# Patient Record
Sex: FEMALE | Race: OTHER | URBAN - METROPOLITAN AREA
[De-identification: names, ages, dates, MRNs, and addresses within clinical notes are randomized per-mention and may not be internally consistent; named-entity substitution may affect disease eponyms.]

---

## 2017-08-11 PROBLEM — Z00.00 ENCOUNTER FOR PREVENTIVE HEALTH EXAMINATION: Status: ACTIVE | Noted: 2017-08-11

## 2024-01-15 ENCOUNTER — EMERGENCY (EMERGENCY)
Facility: HOSPITAL | Age: 84
LOS: 0 days | Discharge: HOME | End: 2024-01-15
Attending: EMERGENCY MEDICINE
Payer: MEDICARE

## 2024-01-15 VITALS
RESPIRATION RATE: 82 BRPM | DIASTOLIC BLOOD PRESSURE: 82 MMHG | OXYGEN SATURATION: 98 % | HEIGHT: 62 IN | HEART RATE: 84 BPM | WEIGHT: 125 LBS | SYSTOLIC BLOOD PRESSURE: 198 MMHG | TEMPERATURE: 98 F

## 2024-01-15 VITALS
DIASTOLIC BLOOD PRESSURE: 69 MMHG | SYSTOLIC BLOOD PRESSURE: 153 MMHG | HEART RATE: 73 BPM | TEMPERATURE: 98 F | RESPIRATION RATE: 18 BRPM | OXYGEN SATURATION: 97 %

## 2024-01-15 DIAGNOSIS — I10 ESSENTIAL (PRIMARY) HYPERTENSION: ICD-10-CM

## 2024-01-15 DIAGNOSIS — F03.90 UNSPECIFIED DEMENTIA WITHOUT BEHAVIORAL DISTURBANCE: ICD-10-CM

## 2024-01-15 DIAGNOSIS — R41.0 DISORIENTATION, UNSPECIFIED: ICD-10-CM

## 2024-01-15 LAB
ALBUMIN SERPL ELPH-MCNC: 3.4 G/DL — SIGNIFICANT CHANGE UP (ref 3.3–5)
ALBUMIN SERPL ELPH-MCNC: 3.4 G/DL — SIGNIFICANT CHANGE UP (ref 3.3–5)
ALP SERPL-CCNC: 112 U/L — SIGNIFICANT CHANGE UP (ref 40–120)
ALP SERPL-CCNC: 112 U/L — SIGNIFICANT CHANGE UP (ref 40–120)
ALT FLD-CCNC: 20 U/L — SIGNIFICANT CHANGE UP (ref 12–78)
ALT FLD-CCNC: 20 U/L — SIGNIFICANT CHANGE UP (ref 12–78)
ANION GAP SERPL CALC-SCNC: 5 MMOL/L — SIGNIFICANT CHANGE UP (ref 5–17)
ANION GAP SERPL CALC-SCNC: 5 MMOL/L — SIGNIFICANT CHANGE UP (ref 5–17)
APPEARANCE UR: CLEAR — SIGNIFICANT CHANGE UP
APPEARANCE UR: CLEAR — SIGNIFICANT CHANGE UP
AST SERPL-CCNC: 17 U/L — SIGNIFICANT CHANGE UP (ref 15–37)
AST SERPL-CCNC: 17 U/L — SIGNIFICANT CHANGE UP (ref 15–37)
BACTERIA # UR AUTO: ABNORMAL /HPF
BACTERIA # UR AUTO: ABNORMAL /HPF
BASOPHILS # BLD AUTO: 0.04 K/UL — SIGNIFICANT CHANGE UP (ref 0–0.2)
BASOPHILS # BLD AUTO: 0.04 K/UL — SIGNIFICANT CHANGE UP (ref 0–0.2)
BASOPHILS NFR BLD AUTO: 0.6 % — SIGNIFICANT CHANGE UP (ref 0–2)
BASOPHILS NFR BLD AUTO: 0.6 % — SIGNIFICANT CHANGE UP (ref 0–2)
BILIRUB SERPL-MCNC: 0.5 MG/DL — SIGNIFICANT CHANGE UP (ref 0.2–1.2)
BILIRUB SERPL-MCNC: 0.5 MG/DL — SIGNIFICANT CHANGE UP (ref 0.2–1.2)
BILIRUB UR-MCNC: NEGATIVE — SIGNIFICANT CHANGE UP
BILIRUB UR-MCNC: NEGATIVE — SIGNIFICANT CHANGE UP
BUN SERPL-MCNC: 24 MG/DL — HIGH (ref 7–23)
BUN SERPL-MCNC: 24 MG/DL — HIGH (ref 7–23)
CALCIUM SERPL-MCNC: 8.7 MG/DL — SIGNIFICANT CHANGE UP (ref 8.5–10.1)
CALCIUM SERPL-MCNC: 8.7 MG/DL — SIGNIFICANT CHANGE UP (ref 8.5–10.1)
CHLORIDE SERPL-SCNC: 106 MMOL/L — SIGNIFICANT CHANGE UP (ref 96–108)
CHLORIDE SERPL-SCNC: 106 MMOL/L — SIGNIFICANT CHANGE UP (ref 96–108)
CO2 SERPL-SCNC: 28 MMOL/L — SIGNIFICANT CHANGE UP (ref 22–31)
CO2 SERPL-SCNC: 28 MMOL/L — SIGNIFICANT CHANGE UP (ref 22–31)
COLOR SPEC: YELLOW — SIGNIFICANT CHANGE UP
COLOR SPEC: YELLOW — SIGNIFICANT CHANGE UP
CREAT SERPL-MCNC: 0.93 MG/DL — SIGNIFICANT CHANGE UP (ref 0.5–1.3)
CREAT SERPL-MCNC: 0.93 MG/DL — SIGNIFICANT CHANGE UP (ref 0.5–1.3)
DIFF PNL FLD: ABNORMAL
DIFF PNL FLD: ABNORMAL
EGFR: 60 ML/MIN/1.73M2 — SIGNIFICANT CHANGE UP
EGFR: 60 ML/MIN/1.73M2 — SIGNIFICANT CHANGE UP
EOSINOPHIL # BLD AUTO: 0.02 K/UL — SIGNIFICANT CHANGE UP (ref 0–0.5)
EOSINOPHIL # BLD AUTO: 0.02 K/UL — SIGNIFICANT CHANGE UP (ref 0–0.5)
EOSINOPHIL NFR BLD AUTO: 0.3 % — SIGNIFICANT CHANGE UP (ref 0–6)
EOSINOPHIL NFR BLD AUTO: 0.3 % — SIGNIFICANT CHANGE UP (ref 0–6)
EPI CELLS # UR: PRESENT
EPI CELLS # UR: PRESENT
GLUCOSE SERPL-MCNC: 278 MG/DL — HIGH (ref 70–99)
GLUCOSE SERPL-MCNC: 278 MG/DL — HIGH (ref 70–99)
GLUCOSE UR QL: 500 MG/DL
GLUCOSE UR QL: 500 MG/DL
HCT VFR BLD CALC: 36.9 % — SIGNIFICANT CHANGE UP (ref 34.5–45)
HCT VFR BLD CALC: 36.9 % — SIGNIFICANT CHANGE UP (ref 34.5–45)
HGB BLD-MCNC: 12.3 G/DL — SIGNIFICANT CHANGE UP (ref 11.5–15.5)
HGB BLD-MCNC: 12.3 G/DL — SIGNIFICANT CHANGE UP (ref 11.5–15.5)
IMM GRANULOCYTES NFR BLD AUTO: 0.4 % — SIGNIFICANT CHANGE UP (ref 0–0.9)
IMM GRANULOCYTES NFR BLD AUTO: 0.4 % — SIGNIFICANT CHANGE UP (ref 0–0.9)
KETONES UR-MCNC: NEGATIVE MG/DL — SIGNIFICANT CHANGE UP
KETONES UR-MCNC: NEGATIVE MG/DL — SIGNIFICANT CHANGE UP
LEUKOCYTE ESTERASE UR-ACNC: NEGATIVE — SIGNIFICANT CHANGE UP
LEUKOCYTE ESTERASE UR-ACNC: NEGATIVE — SIGNIFICANT CHANGE UP
LYMPHOCYTES # BLD AUTO: 0.72 K/UL — LOW (ref 1–3.3)
LYMPHOCYTES # BLD AUTO: 0.72 K/UL — LOW (ref 1–3.3)
LYMPHOCYTES # BLD AUTO: 10.8 % — LOW (ref 13–44)
LYMPHOCYTES # BLD AUTO: 10.8 % — LOW (ref 13–44)
MCHC RBC-ENTMCNC: 29.4 PG — SIGNIFICANT CHANGE UP (ref 27–34)
MCHC RBC-ENTMCNC: 29.4 PG — SIGNIFICANT CHANGE UP (ref 27–34)
MCHC RBC-ENTMCNC: 33.3 G/DL — SIGNIFICANT CHANGE UP (ref 32–36)
MCHC RBC-ENTMCNC: 33.3 G/DL — SIGNIFICANT CHANGE UP (ref 32–36)
MCV RBC AUTO: 88.1 FL — SIGNIFICANT CHANGE UP (ref 80–100)
MCV RBC AUTO: 88.1 FL — SIGNIFICANT CHANGE UP (ref 80–100)
MONOCYTES # BLD AUTO: 0.57 K/UL — SIGNIFICANT CHANGE UP (ref 0–0.9)
MONOCYTES # BLD AUTO: 0.57 K/UL — SIGNIFICANT CHANGE UP (ref 0–0.9)
MONOCYTES NFR BLD AUTO: 8.5 % — SIGNIFICANT CHANGE UP (ref 2–14)
MONOCYTES NFR BLD AUTO: 8.5 % — SIGNIFICANT CHANGE UP (ref 2–14)
NEUTROPHILS # BLD AUTO: 5.31 K/UL — SIGNIFICANT CHANGE UP (ref 1.8–7.4)
NEUTROPHILS # BLD AUTO: 5.31 K/UL — SIGNIFICANT CHANGE UP (ref 1.8–7.4)
NEUTROPHILS NFR BLD AUTO: 79.4 % — HIGH (ref 43–77)
NEUTROPHILS NFR BLD AUTO: 79.4 % — HIGH (ref 43–77)
NITRITE UR-MCNC: NEGATIVE — SIGNIFICANT CHANGE UP
NITRITE UR-MCNC: NEGATIVE — SIGNIFICANT CHANGE UP
NRBC # BLD: 0 /100 WBCS — SIGNIFICANT CHANGE UP (ref 0–0)
NRBC # BLD: 0 /100 WBCS — SIGNIFICANT CHANGE UP (ref 0–0)
PH UR: 5.5 — SIGNIFICANT CHANGE UP (ref 5–8)
PH UR: 5.5 — SIGNIFICANT CHANGE UP (ref 5–8)
PLATELET # BLD AUTO: 255 K/UL — SIGNIFICANT CHANGE UP (ref 150–400)
PLATELET # BLD AUTO: 255 K/UL — SIGNIFICANT CHANGE UP (ref 150–400)
POTASSIUM SERPL-MCNC: 4.1 MMOL/L — SIGNIFICANT CHANGE UP (ref 3.5–5.3)
POTASSIUM SERPL-MCNC: 4.1 MMOL/L — SIGNIFICANT CHANGE UP (ref 3.5–5.3)
POTASSIUM SERPL-SCNC: 4.1 MMOL/L — SIGNIFICANT CHANGE UP (ref 3.5–5.3)
POTASSIUM SERPL-SCNC: 4.1 MMOL/L — SIGNIFICANT CHANGE UP (ref 3.5–5.3)
PROT SERPL-MCNC: 7.2 GM/DL — SIGNIFICANT CHANGE UP (ref 6–8.3)
PROT SERPL-MCNC: 7.2 GM/DL — SIGNIFICANT CHANGE UP (ref 6–8.3)
PROT UR-MCNC: 100 MG/DL
PROT UR-MCNC: 100 MG/DL
RBC # BLD: 4.19 M/UL — SIGNIFICANT CHANGE UP (ref 3.8–5.2)
RBC # BLD: 4.19 M/UL — SIGNIFICANT CHANGE UP (ref 3.8–5.2)
RBC # FLD: 14.2 % — SIGNIFICANT CHANGE UP (ref 10.3–14.5)
RBC # FLD: 14.2 % — SIGNIFICANT CHANGE UP (ref 10.3–14.5)
RBC CASTS # UR COMP ASSIST: SIGNIFICANT CHANGE UP /HPF (ref 0–4)
RBC CASTS # UR COMP ASSIST: SIGNIFICANT CHANGE UP /HPF (ref 0–4)
SODIUM SERPL-SCNC: 139 MMOL/L — SIGNIFICANT CHANGE UP (ref 135–145)
SODIUM SERPL-SCNC: 139 MMOL/L — SIGNIFICANT CHANGE UP (ref 135–145)
SP GR SPEC: 1.02 — SIGNIFICANT CHANGE UP (ref 1–1.03)
SP GR SPEC: 1.02 — SIGNIFICANT CHANGE UP (ref 1–1.03)
UROBILINOGEN FLD QL: 0.2 MG/DL — SIGNIFICANT CHANGE UP (ref 0.2–1)
UROBILINOGEN FLD QL: 0.2 MG/DL — SIGNIFICANT CHANGE UP (ref 0.2–1)
WBC # BLD: 6.69 K/UL — SIGNIFICANT CHANGE UP (ref 3.8–10.5)
WBC # BLD: 6.69 K/UL — SIGNIFICANT CHANGE UP (ref 3.8–10.5)
WBC # FLD AUTO: 6.69 K/UL — SIGNIFICANT CHANGE UP (ref 3.8–10.5)
WBC # FLD AUTO: 6.69 K/UL — SIGNIFICANT CHANGE UP (ref 3.8–10.5)
WBC UR QL: SIGNIFICANT CHANGE UP /HPF (ref 0–5)
WBC UR QL: SIGNIFICANT CHANGE UP /HPF (ref 0–5)

## 2024-01-15 PROCEDURE — 99284 EMERGENCY DEPT VISIT MOD MDM: CPT

## 2024-01-15 PROCEDURE — 70450 CT HEAD/BRAIN W/O DYE: CPT | Mod: 26,MA

## 2024-01-15 RX ORDER — AMLODIPINE BESYLATE 2.5 MG/1
5 TABLET ORAL ONCE
Refills: 0 | Status: COMPLETED | OUTPATIENT
Start: 2024-01-15 | End: 2024-01-15

## 2024-01-15 RX ADMIN — AMLODIPINE BESYLATE 5 MILLIGRAM(S): 2.5 TABLET ORAL at 14:07

## 2024-01-15 NOTE — ED PROVIDER NOTE - MUSCULOSKELETAL, MLM
Spine appears normal, range of motion is not limited, no muscle or joint tenderness no edema no tender to palp bilateral legs

## 2024-01-15 NOTE — ED ADULT TRIAGE NOTE - PATIENT ON (OXYGEN DELIVERY METHOD)
02/13/23    Patient notified form is ready for  by leaving a voice message  Form faxed 4 times to [phone], confirmation received that it did not go through  [address] [place] [address],[address] [place] [address] for another fax #, but they did not have another fax #      Form scanned into Pt Chart &  Placed at  [address], under the letter C          Form scanned into patient chart  Form placed in  bin room air

## 2024-01-15 NOTE — ED PROVIDER NOTE - NS ED MD DISPO DISCHARGE
Please call patient.     Her IUD is in the correct position based on the pelvic US without any other abnormalities.  How did she tolerate the ultrasound?     If she has pain with the ultrasound she could have an infection and we need to treat her for this. If so, I would like to see her in clinic and I could add her on tomorrow at 8:45 AM or 4:45 PM.     Results:     Transabdominal and transvaginal scanning of the pelvis demonstrates an  anteverted uterus measuring 5.0 cm long axis. Endometrial thickness is  approximately 3 mm. There is an IUD in good position the endometrial  cavity. The right ovary measures 3.3 x 1.7 x 1.8 cm. Left ovary measures  3.0 x 1.6 x 1.8 cm. No free fluid is seen.     IMPRESSION: IUD in good position in the endometrial cavity       Gris Sharpe MD     Home

## 2024-01-15 NOTE — ED PROVIDER NOTE - CLINICAL SUMMARY MEDICAL DECISION MAKING FREE TEXT BOX
83 years old female brought in by her daughter Ermelinda Morales [phone] sts pt has been having increased confusion for the past week and pt was wondering out side of house and return to go back noted by the neighbor. She sts pt liver alone no aide at home. Pt is alert and oriented x 2 follows verbal commends and sts her daughter is trying to take her money and she has a friend who is helping her at home. labs ct of head and  is notified. 83 years old female brought in by her daughter Ermelinda Morales [phone] sts pt has been having increased confusion for the past week and pt was wondering out side of house and return to go back noted by the neighbor. She sts pt liver alone no aide at home. Pt is alert and oriented x 2 follows verbal commends and sts her daughter is trying to take her money and she has a friend who is helping her at home. labs ct of head and  is notified. 83 years old female brought in by her daughter Ermelinda Morales [phone] sts pt has been having increased confusion for the past week and pt was wondering out side of house and return to go back noted by the neighbor. She sts pt liver alone no aide at home. Pt is alert and oriented x 2 follows verbal commends and sts her daughter is trying to take her money and she has a friend who is helping her at home. labs ct of head and  is notified. 83 years old female brought in by her daughter Ermelinda Morales [phone] sts pt has been having increased confusion for the past week and pt was wondering out side of house and return to go back noted by the neighbor. She sts pt liver alone no aide at home. Pt is alert and oriented x 2 follows verbal commends and sts her daughter is trying to take her money and she has a friend who is helping her at home. labs ct of head and  is notified.  wbc, h/h normal, glucose creatine lft and urine are normal. Ermelinda [facility] she is currently trying to get an aide currently.   progress note. 83 years old female brought in by her daughter Ermelinda Morales [phone] sts pt has been having increased confusion for the past week and pt was wondering out side of house and return to go back noted by the neighbor. She sts pt liver alone no aide at home. Pt is alert and oriented x 2 follows verbal commends and sts her daughter is trying to take her money and she has a friend who is helping her at home. labs ct of head and  is notified.  wbc, h/h normal, glucose creatine lft and urine are normal. Ermelinda [facility] she is currently trying to get an aide currently.   progress note. 83 years old female brought in by her daughter Ermelinda Morales [phone] sts pt has been having increased confusion for the past week and pt was wondering out side of house and return to go back noted by the neighbor. She sts pt liver alone no aide at home. Pt is alert and oriented x 2 follows verbal commends and sts her daughter is trying to take her money and she has a friend who is helping her at home. labs ct of head and  is notified.  wbc, h/h normal, glucose creatine lft and urine are normal. Ermelinda [facility] she is currently trying to get an aide currently.   progress note.

## 2024-01-15 NOTE — ED ADULT TRIAGE NOTE - CHIEF COMPLAINT QUOTE
As per ems patient daughter states patient has dementia and has been combative, wandering outside without coat for the past week. As per ems patient daughter states patient has dementia and has been combative, wandering outside without coat for the past week. h/o htn, dementia.

## 2024-01-15 NOTE — ED ADULT NURSE NOTE - NSFALLHARMRISKINTERV_ED_ALL_ED
Assistance OOB with selected safe patient handling equipment if applicable/Assistance with ambulation/Communicate risk of Fall with Harm to all staff, patient, and family/Monitor gait and stability/Monitor for mental status changes and reorient to person, place, and time, as needed/Provide visual cue: red socks, yellow wristband, yellow gown, etc/Reinforce activity limits and safety measures with patient and family/Toileting schedule using arm’s reach rule for commode and bathroom/Use of alarms - bed, stretcher, chair and/or video monitoring/Bed in lowest position, wheels locked, appropriate side rails in place/Call bell, personal items and telephone in reach/Instruct patient to call for assistance before getting out of bed/chair/stretcher/Non-slip footwear applied when patient is off stretcher/[place] to call system/Physically safe environment - no spills, clutter or unnecessary equipment/Purposeful Proactive Rounding/Room/bathroom lighting operational, light cord in reach Assistance OOB with selected safe patient handling equipment if applicable/Assistance with ambulation/Communicate risk of Fall with Harm to all staff, patient, and family/Monitor gait and stability/Monitor for mental status changes and reorient to person, place, and time, as needed/Provide visual cue: red socks, yellow wristband, yellow gown, etc/Reinforce activity limits and safety measures with patient and family/Toileting schedule using arm’s reach rule for commode and bathroom/Use of alarms - bed, stretcher, chair and/or video monitoring/Bed in lowest position, wheels locked, appropriate side rails in place/Call bell, personal items and telephone in reach/Instruct patient to call for assistance before getting out of bed/chair/stretcher/Non-slip footwear applied when patient is off stretcher/[place] to call system/Physically safe environment - no spills, clutter or unnecessary equipment/Purposeful Proactive Rounding/Room/bathroom lighting operational, light cord in reach Assistance OOB with selected safe patient handling equipment if applicable/Assistance with ambulation/Communicate risk of Fall with Harm to all staff, patient, and family/Monitor gait and stability/Monitor for mental status changes and reorient to person, place, and time, as needed/Provide visual cue: red socks, yellow wristband, yellow gown, etc/Reinforce activity limits and safety measures with patient and family/Toileting schedule using arm’s reach rule for commode and bathroom/Use of alarms - bed, stretcher, chair and/or video monitoring/Bed in lowest position, wheels locked, appropriate side rails in place/Call bell, personal items and telephone in reach/Instruct patient to call for assistance before getting out of bed/chair/stretcher/Non-slip footwear applied when patient is off stretcher/[place] to call system/Physically safe environment - no spills, clutter or unnecessary equipment/Purposeful Proactive Rounding/Room/bathroom lighting operational, light cord in reach

## 2024-01-15 NOTE — ED PROVIDER NOTE - PROGRESS NOTE DETAILS
is notified.  was here talked to pt's daughter who sts she will come back to take pt home if pt is discharged. daughter's friend is here sts pt is at her base line mental status now pt did have intermittent of episode being nervous she will take pt home and also sts the daughter is trying to get an aide. Pt has been cooperative and calm daughter Ermelinda is notified and explained all test result and sts she is coming to take pt home.

## 2024-01-15 NOTE — ED PROVIDER NOTE - PATIENT PORTAL LINK FT
You can access the FollowMyHealth Patient Portal offered by [facility] by registering at the following website: [email or web address]. By joining [employer]’s FollowMyHealth portal, you will also be able to view your health information using other applications (apps) compatible with our system. You can access the FollowMyHealth Patient Portal offered by [facility] by registering at the following website: [email or web address]. By joining [employer]’s FollowMyHealth portal, you will also be able to view your health information using other applications (apps) compatible with our system. You can access the FollowMyHealth Patient Portal offered by [facility] by registering at the following website: [email or web address]. By joining [employer]’s FollowMyHealth portal, you will also be able to view your health information using other applications (apps) compatible with our system.

## 2024-01-15 NOTE — ED ADULT NURSE NOTE - CHIEF COMPLAINT QUOTE
As per ems patient daughter states patient has dementia and has been combative, wandering outside without coat for the past week. h/o htn, dementia.

## 2024-01-15 NOTE — ED ADULT NURSE NOTE - CINV DISCH TEACH PARTICIP
PT was discharged home with her daughter, Discharge instructions were done with her daughter./Patient

## 2024-01-15 NOTE — ED PROVIDER NOTE - NEUROLOGICAL LEVEL OF CONSCIOUSNESS
[facility] - Lexie / Salima WORTHY /Oliver/ ZAYNAB Coughlin/ ZAYNAB Hidalgo/ Johnson Livingston / JACOB Njeru  ---------------------------------------------------------------------------------------------------------------    Patient seen and examined bedside    Subjective and Objective: No overnight events, more alert  no V/D per RN    Allergies: No Known Allergies      Hospital Medications:   MEDICATIONS  (STANDING):  amLODIPine   Tablet 10 milliGRAM(s) Oral daily  aspirin enteric coated 81 milliGRAM(s) Oral daily  cefTRIAXone   IVPB 1000 milliGRAM(s) IV Intermittent every 24 hours  dextrose 5%. 1000 milliLiter(s) (50 mL/Hr) IV Continuous <Continuous>  dextrose 50% Injectable 12.5 Gram(s) IV Push once  dextrose 50% Injectable 25 Gram(s) IV Push once  dextrose 50% Injectable 25 Gram(s) IV Push once  heparin  Injectable 5000 Unit(s) SubCutaneous every 8 hours  insulin lispro (HumaLOG) corrective regimen sliding scale   SubCutaneous three times a day before meals  insulin lispro (HumaLOG) corrective regimen sliding scale   SubCutaneous at bedtime  memantine 10 milliGRAM(s) Oral daily  metoprolol tartrate 12.5 milliGRAM(s) Oral two times a day  multivitamin 1 Tablet(s) Oral daily  sodium bicarbonate 650 milliGRAM(s) Oral two times a day  sodium chloride 0.9%. 1000 milliLiter(s) (60 mL/Hr) IV Continuous <Continuous>      VITALS:  T(F): 97.1 (20 @ 04:50), Max: 98.3 (20 @ 12:50)  HR: 74 (20 @ 04:50)  BP: 139/65 (20 @ 04:50)  RR: 16 (20 @ 04:50)  SpO2: 99% (20 @ 04:50)  Wt(kg): --     @ 07:  -   @ 07:00  --------------------------------------------------------  IN: 968 mL / OUT: 1350 mL / NET: -382 mL        PHYSICAL EXAM:  Constitutional: NAD  HEENT: anicteric sclera, oropharynx clear  Neck: No JVD  Respiratory: CTAB, no wheezes, rales or rhonchi  Cardiovascular: S1, S2, RRR  Gastrointestinal: BS+, soft, NT/ND  Extremities: No cyanosis or clubbing. No peripheral edema  Neurological: A/O x 1  : No murcia.       LABS:      138  |  110<H>  |  24<H>  ----------------------------<  134<H>  3.8   |  17<L>  |  1.48<H>    Ca    9.1      2020 05:45      Creatinine Trend: 1.48 <--, 1.47 <--, 1.50 <--, 1.49 <--, 2.09 <--, 2.27 <--, 2.05 <--                        10.1   7.88  )-----------( 199      ( 2020 05:45 )             31.9     Urine Studies:  Urinalysis Basic - ( 2020 03:20 )    Color: YELLOW / Appearance: CLEAR / S.019 / pH: 6.0  Gluc: NEGATIVE / Ketone: NEGATIVE  / Bili: NEGATIVE / Urobili: NORMAL   Blood: NEGATIVE / Protein: 30 / Nitrite: NEGATIVE   Leuk Esterase: SMALL / RBC: 0-2 / WBC 6-10   Sq Epi: FEW / Non Sq Epi:  / Bacteria: NEGATIVE      Sodium, Random Urine: 144 mmol/L ( @ 18:07)  Chloride, Random Urine: 124 mmol/L ( @ 18:07)  Osmolality, Random Urine: 494 mosmo/kg ( @ 18:07)      RADIOLOGY & ADDITIONAL STUDIES: [facility] - Lexie / Salima WORTHY /Oliver/ ZAYNAB Coughlin/ ZAYNAB Hidalgo/ Johnson Livingston / JACOB Njeru  ---------------------------------------------------------------------------------------------------------------    Patient seen and examined bedside    Subjective and Objective: No overnight events, more alert  no V/D per RN    Allergies: No Known Allergies      Hospital Medications:   MEDICATIONS  (STANDING):  amLODIPine   Tablet 10 milliGRAM(s) Oral daily  aspirin enteric coated 81 milliGRAM(s) Oral daily  cefTRIAXone   IVPB 1000 milliGRAM(s) IV Intermittent every 24 hours  dextrose 5%. 1000 milliLiter(s) (50 mL/Hr) IV Continuous <Continuous>  dextrose 50% Injectable 12.5 Gram(s) IV Push once  dextrose 50% Injectable 25 Gram(s) IV Push once  dextrose 50% Injectable 25 Gram(s) IV Push once  heparin  Injectable 5000 Unit(s) SubCutaneous every 8 hours  insulin lispro (HumaLOG) corrective regimen sliding scale   SubCutaneous three times a day before meals  insulin lispro (HumaLOG) corrective regimen sliding scale   SubCutaneous at bedtime  memantine 10 milliGRAM(s) Oral daily  metoprolol tartrate 12.5 milliGRAM(s) Oral two times a day  multivitamin 1 Tablet(s) Oral daily  sodium bicarbonate 650 milliGRAM(s) Oral two times a day  sodium chloride 0.9%. 1000 milliLiter(s) (60 mL/Hr) IV Continuous <Continuous>      VITALS:  T(F): 97.1 (20 @ 04:50), Max: 98.3 (20 @ 12:50)  HR: 74 (20 @ 04:50)  BP: 139/65 (20 @ 04:50)  RR: 16 (20 @ 04:50)  SpO2: 99% (20 @ 04:50)  Wt(kg): --     @ 07:  -   @ 07:00  --------------------------------------------------------  IN: 968 mL / OUT: 1350 mL / NET: -382 mL      PHYSICAL EXAM:  Constitutional: NAD  HEENT: anicteric sclera, oropharynx clear  Neck: No JVD  Respiratory: CTAB, no wheezes, rales or rhonchi  Cardiovascular: S1, S2, RRR  Gastrointestinal: BS+, soft, NT  Extremities: No cyanosis or clubbing. No peripheral edema  Neurological: A/O x 1  : +murcia.       LABS:      138  |  110<H>  |  24<H>  ----------------------------<  134<H>  3.8   |  17<L>  |  1.48<H>    Ca    9.1      2020 05:45      Creatinine Trend: 1.48 <--, 1.47 <--, 1.50 <--, 1.49 <--, 2.09 <--, 2.27 <--, 2.05 <--                        10.1   7.88  )-----------( 199      ( 2020 05:45 )             31.9     Urine Studies:  Urinalysis Basic - ( 2020 03:20 )    Color: YELLOW / Appearance: CLEAR / S.019 / pH: 6.0  Gluc: NEGATIVE / Ketone: NEGATIVE  / Bili: NEGATIVE / Urobili: NORMAL   Blood: NEGATIVE / Protein: 30 / Nitrite: NEGATIVE   Leuk Esterase: SMALL / RBC: 0-2 / WBC 6-10   Sq Epi: FEW / Non Sq Epi:  / Bacteria: NEGATIVE      Sodium, Random Urine: 144 mmol/L ( @ 18:07)  Chloride, Random Urine: 124 mmol/L ( @ 18:07)  Osmolality, Random Urine: 494 mosmo/kg ( @ 18:07)      RADIOLOGY & ADDITIONAL STUDIES: alert/follows commands/CONFUSED

## 2024-01-15 NOTE — ED PROVIDER NOTE - OBJECTIVE STATEMENT
83 years old female brought in by daughter Ermelinda [phone] sts pt has been having increased confusion for the past week. Today pt was wondering out side of house and refused to go home. Daughter sts pt has a hx of dementia and hypertension and pt lives alone Daughter sts she is not sure pt is taking her med for hypertension and daughter does not know the name of medication. no medication on the [facility] record. Pt is alert and following verbal commends but unable to give detail hx. pt is alert and oriented to person and time only but pt sts she has a hx of cataract uses eye drop but she dose have hypertension. Pt sts her daughter is trying to take away her money. 83 years old female brought in by daughter Ermelinda [phone] sts pt has been having increased confusion for the past week. Today pt was wondering out side of house and refused to go home. Daughter sts pt has a hx of dementia and hypertension and pt lives alone Daughter sts she is not sure pt is taking her med for hypertension and daughter does not know the name of medication. no medication on the [facility] rise record. Pt is alert and following verbal commends but unable to give detail hx. pt is alert and oriented to person and time only but pt sts she has a hx of cataract uses eye drop but she dose have hypertension. Pt sts her daughter is trying to take away her money. 83 years old female brought in by daughter Ermelinda [phone] sts pt has been having increased confusion for the past week. Today pt was wondering out side of house and refused to go home. Daughter sts pt has a hx of dementia and hypertension and pt lives alone Daughter sts she is not sure pt is taking her med for hypertension and daughter does not know the name of medication. no medication on the sun rise record. Pt is alert and following verbal commends but unable to give detail hx. pt is alert and oriented to person and time only but pt sts she has a hx of cataract uses eye drop but she dose have hypertension. Pt sts her daughter is trying to take away her money. 83 years old female brought in by daughter Ermelinda [phone] sts pt has been having intermittent increased confusion for the past week. Today pt was wondering out side of house and refused to go home. Daughter sts pt has a hx of dementia and hypertension and pt lives alone Daughter sts she is not sure pt is taking her med for hypertension and daughter does not know the name of medication. no medication on the sun rise record. Pt is alert and following verbal commends but unable to give detail hx. pt is alert and oriented to person and time only but pt sts she has a hx of cataract uses eye drop but she dose have hypertension. Pt sts her daughter is trying to take away her money. 83 years old female brought in by daughter Ermelinda [phone] sts pt has been having intermittent increased confusion for the past week. Today pt was wondering out side of house and refused to go home. Daughter sts pt has a hx of dementia and hypertension and pt lives alone Daughter sts she is not sure pt is taking her med for hypertension and daughter does not know the name of medication. no medication on the sun rise record. Pt is alert and following verbal commends but unable to give detail hx. pt is alert and oriented to person and time only but pt sts she has a hx of cataract uses eye drop but she dose have hypertension. Pt sts her daughter is trying to take away her money. 83 years old female brought in by daughter Ermelinda [phone] sts pt has been having intermittent increased confusion for the past week. Today pt was wondering out side of house and refused to go home. Daughter sts pt has a hx of dementia and hypertension and pt lives alone Daughter sts she is not sure pt is taking her med for hypertension and daughter does not know the name of medication. no medication on the [facility] record. Pt is alert and following verbal commends but unable to give detail hx. pt is alert and oriented to person and time only but pt sts she has a hx of cataract uses eye drop but she dose have hypertension. Pt sts her daughter is trying to take away her money.

## 2024-01-15 NOTE — ED PROVIDER NOTE - CONSTITUTIONAL, MLM
normal... Well appearing, awake, alert, oriented to person, place, and in no apparent distress. Speaking in clear full sentences taking and tolerating lunch in the stretcher appears very comfortable

## 2024-08-21 ENCOUNTER — INPATIENT (INPATIENT)
Facility: HOSPITAL | Age: 84
LOS: 7 days | Discharge: NURSING, REHAB OR LONG-TERM CARE | End: 2024-08-29
Attending: STUDENT IN AN ORGANIZED HEALTH CARE EDUCATION/TRAINING PROGRAM | Admitting: STUDENT IN AN ORGANIZED HEALTH CARE EDUCATION/TRAINING PROGRAM
Payer: MEDICARE

## 2024-08-21 VITALS
SYSTOLIC BLOOD PRESSURE: 132 MMHG | RESPIRATION RATE: 18 BRPM | HEART RATE: 72 BPM | WEIGHT: 113.98 LBS | OXYGEN SATURATION: 97 % | HEIGHT: 62 IN | DIASTOLIC BLOOD PRESSURE: 80 MMHG | TEMPERATURE: 98 F

## 2024-08-21 PROBLEM — H26.9 UNSPECIFIED CATARACT: Chronic | Status: ACTIVE | Noted: 2024-01-15

## 2024-08-21 LAB
ALBUMIN SERPL ELPH-MCNC: 3.3 G/DL — SIGNIFICANT CHANGE UP (ref 3.3–5)
ALP SERPL-CCNC: 55 U/L — SIGNIFICANT CHANGE UP (ref 40–120)
ALT FLD-CCNC: 27 U/L — SIGNIFICANT CHANGE UP (ref 12–78)
ANION GAP SERPL CALC-SCNC: 13 MMOL/L — SIGNIFICANT CHANGE UP (ref 5–17)
APPEARANCE UR: ABNORMAL
AST SERPL-CCNC: 29 U/L — SIGNIFICANT CHANGE UP (ref 15–37)
BACTERIA # UR AUTO: NEGATIVE /HPF — SIGNIFICANT CHANGE UP
BASOPHILS # BLD AUTO: 0.02 K/UL — SIGNIFICANT CHANGE UP (ref 0–0.2)
BASOPHILS NFR BLD AUTO: 0.2 % — SIGNIFICANT CHANGE UP (ref 0–2)
BILIRUB SERPL-MCNC: 1.4 MG/DL — HIGH (ref 0.2–1.2)
BILIRUB UR-MCNC: NEGATIVE — SIGNIFICANT CHANGE UP
BUN SERPL-MCNC: 23 MG/DL — SIGNIFICANT CHANGE UP (ref 7–23)
CALCIUM SERPL-MCNC: 9.3 MG/DL — SIGNIFICANT CHANGE UP (ref 8.5–10.1)
CHLORIDE SERPL-SCNC: 104 MMOL/L — SIGNIFICANT CHANGE UP (ref 96–108)
CO2 SERPL-SCNC: 22 MMOL/L — SIGNIFICANT CHANGE UP (ref 22–31)
COLOR SPEC: YELLOW — SIGNIFICANT CHANGE UP
CREAT SERPL-MCNC: 1.02 MG/DL — SIGNIFICANT CHANGE UP (ref 0.5–1.3)
DIFF PNL FLD: NEGATIVE — SIGNIFICANT CHANGE UP
EGFR: 54 ML/MIN/1.73M2 — LOW
EOSINOPHIL # BLD AUTO: 0 K/UL — SIGNIFICANT CHANGE UP (ref 0–0.5)
EOSINOPHIL NFR BLD AUTO: 0 % — SIGNIFICANT CHANGE UP (ref 0–6)
EPI CELLS # UR: PRESENT
FLUAV AG NPH QL: SIGNIFICANT CHANGE UP
FLUBV AG NPH QL: SIGNIFICANT CHANGE UP
GLUCOSE SERPL-MCNC: 81 MG/DL — SIGNIFICANT CHANGE UP (ref 70–99)
GLUCOSE UR QL: NEGATIVE MG/DL — SIGNIFICANT CHANGE UP
HCT VFR BLD CALC: 37.3 % — SIGNIFICANT CHANGE UP (ref 34.5–45)
HGB BLD-MCNC: 12.4 G/DL — SIGNIFICANT CHANGE UP (ref 11.5–15.5)
IMM GRANULOCYTES NFR BLD AUTO: 0.4 % — SIGNIFICANT CHANGE UP (ref 0–0.9)
KETONES UR-MCNC: 40 MG/DL
LEUKOCYTE ESTERASE UR-ACNC: NEGATIVE — SIGNIFICANT CHANGE UP
LYMPHOCYTES # BLD AUTO: 0.62 K/UL — LOW (ref 1–3.3)
LYMPHOCYTES # BLD AUTO: 6.4 % — LOW (ref 13–44)
MCHC RBC-ENTMCNC: 27.9 PG — SIGNIFICANT CHANGE UP (ref 27–34)
MCHC RBC-ENTMCNC: 33.2 G/DL — SIGNIFICANT CHANGE UP (ref 32–36)
MCV RBC AUTO: 84 FL — SIGNIFICANT CHANGE UP (ref 80–100)
MONOCYTES # BLD AUTO: 0.74 K/UL — SIGNIFICANT CHANGE UP (ref 0–0.9)
MONOCYTES NFR BLD AUTO: 7.7 % — SIGNIFICANT CHANGE UP (ref 2–14)
NEUTROPHILS # BLD AUTO: 8.25 K/UL — HIGH (ref 1.8–7.4)
NEUTROPHILS NFR BLD AUTO: 85.3 % — HIGH (ref 43–77)
NITRITE UR-MCNC: NEGATIVE — SIGNIFICANT CHANGE UP
NRBC # BLD: 0 /100 WBCS — SIGNIFICANT CHANGE UP (ref 0–0)
PH UR: 5.5 — SIGNIFICANT CHANGE UP (ref 5–8)
PLATELET # BLD AUTO: 228 K/UL — SIGNIFICANT CHANGE UP (ref 150–400)
POTASSIUM SERPL-MCNC: 3.5 MMOL/L — SIGNIFICANT CHANGE UP (ref 3.5–5.3)
POTASSIUM SERPL-SCNC: 3.5 MMOL/L — SIGNIFICANT CHANGE UP (ref 3.5–5.3)
PROT SERPL-MCNC: 7 GM/DL — SIGNIFICANT CHANGE UP (ref 6–8.3)
PROT UR-MCNC: 100 MG/DL
RBC # BLD: 4.44 M/UL — SIGNIFICANT CHANGE UP (ref 3.8–5.2)
RBC # FLD: 14.3 % — SIGNIFICANT CHANGE UP (ref 10.3–14.5)
RBC CASTS # UR COMP ASSIST: 0 /HPF — SIGNIFICANT CHANGE UP (ref 0–4)
SARS-COV-2 RNA SPEC QL NAA+PROBE: SIGNIFICANT CHANGE UP
SODIUM SERPL-SCNC: 139 MMOL/L — SIGNIFICANT CHANGE UP (ref 135–145)
SP GR SPEC: 1.02 — SIGNIFICANT CHANGE UP (ref 1–1.03)
UROBILINOGEN FLD QL: 1 MG/DL — SIGNIFICANT CHANGE UP (ref 0.2–1)
WBC # BLD: 9.67 K/UL — SIGNIFICANT CHANGE UP (ref 3.8–10.5)
WBC # FLD AUTO: 9.67 K/UL — SIGNIFICANT CHANGE UP (ref 3.8–10.5)
WBC UR QL: 1 /HPF — SIGNIFICANT CHANGE UP (ref 0–5)

## 2024-08-21 PROCEDURE — 71045 X-RAY EXAM CHEST 1 VIEW: CPT | Mod: 26

## 2024-08-21 PROCEDURE — 99222 1ST HOSP IP/OBS MODERATE 55: CPT

## 2024-08-21 PROCEDURE — 70450 CT HEAD/BRAIN W/O DYE: CPT | Mod: 26,MC

## 2024-08-21 PROCEDURE — 99285 EMERGENCY DEPT VISIT HI MDM: CPT

## 2024-08-21 RX ORDER — SODIUM CHLORIDE 9 MG/ML
1000 INJECTION INTRAMUSCULAR; INTRAVENOUS; SUBCUTANEOUS ONCE
Refills: 0 | Status: COMPLETED | OUTPATIENT
Start: 2024-08-21 | End: 2024-08-21

## 2024-08-21 RX ORDER — MAGNESIUM, ALUMINUM HYDROXIDE 200-225/5
30 SUSPENSION, ORAL (FINAL DOSE FORM) ORAL EVERY 4 HOURS
Refills: 0 | Status: DISCONTINUED | OUTPATIENT
Start: 2024-08-21 | End: 2024-08-29

## 2024-08-21 RX ORDER — ACETAMINOPHEN 325 MG/1
650 TABLET ORAL EVERY 6 HOURS
Refills: 0 | Status: DISCONTINUED | OUTPATIENT
Start: 2024-08-21 | End: 2024-08-29

## 2024-08-21 RX ORDER — ENOXAPARIN SODIUM 100 MG/ML
40 INJECTION SUBCUTANEOUS EVERY 24 HOURS
Refills: 0 | Status: DISCONTINUED | OUTPATIENT
Start: 2024-08-21 | End: 2024-08-29

## 2024-08-21 RX ORDER — ONDANSETRON 2 MG/ML
4 INJECTION, SOLUTION INTRAMUSCULAR; INTRAVENOUS EVERY 8 HOURS
Refills: 0 | Status: DISCONTINUED | OUTPATIENT
Start: 2024-08-21 | End: 2024-08-29

## 2024-08-21 RX ADMIN — SODIUM CHLORIDE 1000 MILLILITER(S): 9 INJECTION INTRAMUSCULAR; INTRAVENOUS; SUBCUTANEOUS at 17:17

## 2024-08-21 NOTE — ED ADULT NURSE NOTE - CHIEF COMPLAINT QUOTE
BIBEMS from home c/o AMS failure to thrive 911 activated by neighbor Denia [phone] as per neighbor pt was last seen yesterday  and that she ussaly comes out daily , EMS found pt in living room  sitting up right AAO x person and time but confused  and covered in feces with her living environment full with clutter  , pt verbalized paranoia that her family is stealing her money. FS 88 pt denies any complaints

## 2024-08-21 NOTE — H&P ADULT - ASSESSMENT
85 y/o F with PMH dementia (lives alone) presenting to the ED w/ altered mental status. she was altered and covered in feces in the living room. Workup negative for infection or other concerns.

## 2024-08-21 NOTE — ED ADULT NURSE NOTE - ED STAT RN HANDOFF DETAILS 2
Report endorsed to JAZMIN Dejesus . Safety checks completed this shift. Safety rounds completed hourly.  IV sites checked Q2+remains WDL. Medications administered as ordered with no signs/symptoms of adverse reactions. Fall & skin precautions in place. Any issues endorsed to oncoming RN for follow up. no acute distress noted. awake and alert.

## 2024-08-21 NOTE — H&P ADULT - PROBLEM SELECTOR PLAN 1
pt confused and unsure what happened  pt lives alone and it appears it is not safe for her to live alone  No evidence of infection or other causes of altered status. Likely dementia decline  Social work for placement.

## 2024-08-21 NOTE — ED ADULT NURSE NOTE - NSFALLRISKINTERV_ED_ALL_ED
Assistance OOB with selected safe patient handling equipment if applicable/Assistance with ambulation/Communicate fall risk and risk factors to all staff, patient, and family/Monitor gait and stability/Monitor for mental status changes and reorient to person, place, and time, as needed/Provide visual cue: yellow wristband, yellow gown, etc/Reinforce activity limits and safety measures with patient and family/Toileting schedule using arm’s reach rule for commode and bathroom/Use of alarms - bed, stretcher, chair and/or video monitoring/Call bell, personal items and telephone in reach/Instruct patient to call for assistance before getting out of bed/chair/stretcher/Non-slip footwear applied when patient is off stretcher/[place] to call system/Physically safe environment - no spills, clutter or unnecessary equipment/Purposeful Proactive Rounding/Room/bathroom lighting operational, light cord in reach

## 2024-08-21 NOTE — ED PROVIDER NOTE - OBJECTIVE STATEMENT
85 y/o F with PMH dementia (lives alone) presenting to the ED w/ altered mental status. Per EMS, patient's neighbor called because she is usually active and comes out of the house daily but today she was altered and covered in feces in the living room. Has not been seen since yesterday, daughter spoke to her a few days ago. Patient has no acute complaints but has some paranoid thoughts and thinks her daughter is stealing her money. Hx is limited due to dementia.

## 2024-08-21 NOTE — ED PROVIDER NOTE - PHYSICAL EXAMINATION
GENERAL: Awake, alert, NAD  HEENT: NC/AT, dry mucous membranes  LUNGS: CTAB, no wheezes or crackles   CARDIAC: RRR, no m/r/g  ABDOMEN: Soft, normal BS, non tender, non distended, no rebound, no guarding  EXT: No edema, no calf tenderness, no deformities.  NEURO: A&Ox2. Moving all extremities.  SKIN: Warm and dry. No rash.  PSYCH: Normal affect.

## 2024-08-21 NOTE — ED ADULT NURSE NOTE - CODE STROKE ACTIVE YN
Care After Your Endoscopy  Dr. Cody Brady  [phone]    Activity  • For the next 24 hours: Do not stay alone, drive a car, use electrical/power tools or appliances, drink alcohol, or sign any legal papers.  • Do not do any strenuous activity for 24 hours (for example: bicycling, jogging, and exercising).     Diet  · You may resume a regular diet.    Special Instructions  • You had no polyps/biopsies taken.  • Some procedures, such as biopsies or removal of polyps, may cause minimal bleeding for 7-14 days.   • If bleeding becomes excessive, if you have black tarry stools, or you are worried call Dr. Brady.  • If polyps/biopsies were removed, do not take any aspirin, arthritis medication, Ibuprofen, (Nuprin, Advil, Motrin, Aleve) for 10 (ten) days due to possible bleeding.  • You may take Tylenol (acetaminophen).   • Recommend the use of an over-the-counter probiotic for 30 days after a colonoscopy to restore colon jaye.  (Ex: Florajen)    Call the doctor if you have:  • Fever over 101 degrees (oral).  • Persistent nausea/vomiting (over 12 hours).  • Abnormal pain (sharp, severe abdominal pain).  • Increased pain (bloating, pressure).      If you had polyps or biopsies taken, Dr. Brady’s office will contact you once the results have been reviewed by Dr Brady.         
No

## 2024-08-21 NOTE — ED ADULT NURSE NOTE - OBJECTIVE STATEMENT
Covering primary Rn Emily. 84 y.o female A&Ox2-3, c/o AMS and failure to thrive. As per EMS, neighbor called 911 regarding pt. EMS states pt is usually active and comes out daily, but not seen today by neighbor. pt found in living room sitting upright A&Ox2-3. pt unsure of month but is aware of date. pt found covered in feces and  with  her living environment full with clutter  , pt verbalized paranoia that her family is stealing her money. FS 88 pt denies any complaints. Patient denies any sob, difficulty breathing, dizziness, headache, vision changes, cp, palpations, abdominal pain, n/v/d, fever, chills, numbness, tingling, urinary symptoms, LE swelling. no acute distress noted. respirations even and unlabored. Covering primary Rn Emily. 84 y.o female A&Ox2-3, c/o AMS and failure to thrive. As per EMS, neighbor called 911 regarding pt. EMS states pt is usually active and comes out daily, but not seen today by neighbor. pt found in living room sitting upright A&Ox2-3. pt unsure of month but is aware of date. pt found covered in feces and  with  her living environment full with clutter  , Neighbor states she has not seen pt since yesterday, Neighbor states "she looked out of it", Neighbor states as she was  talking to pt, pt went back to the sofa and went back to sleep. pt verbalized paranoia that her family is stealing her money. FS 88 pt denies any complaints. Patient denies any sob, difficulty breathing, dizziness, headache, vision changes, cp, palpations, abdominal pain, n/v/d, fever, chills, numbness, tingling, urinary symptoms, LE swelling. no acute distress noted. respirations even and unlabored. no neuro deficits noted. Covering primary Rn Emily. 84 y.o female A&Ox2-3, c/o AMS and failure to thrive. As per EMS, neighbor called 911 regarding pt. EMS states pt is usually active and comes out daily, but not seen today by neighbor. pt found in living room sitting upright A&Ox2-3. pt unsure of month but is aware of year. pt found covered in feces and  with  her living environment full with clutter  , Neighbor states she has not seen pt since yesterday, Neighbor states "she looked out of it", Neighbor states as she was  talking to pt, pt went back to the sofa and went back to sleep. pt verbalized paranoia that her family is stealing her money. FS 88 pt denies any complaints. Patient denies any sob, difficulty breathing, dizziness, headache, vision changes, cp, palpations, abdominal pain, n/v/d, fever, chills, numbness, tingling, urinary symptoms, LE swelling. no acute distress noted. respirations even and unlabored. no neuro deficits noted.

## 2024-08-21 NOTE — H&P ADULT - NSHPPHYSICALEXAM_GEN_ALL_CORE
VITALS:   T(C): 36.6 (08-22-24 @ 05:34), Max: 36.7 (08-21-24 @ 23:37)  HR: 54 (08-22-24 @ 05:34) (54 - 72)  BP: 151/70 (08-22-24 @ 05:34) (132/80 - 151/70)  RR: 18 (08-22-24 @ 05:34) (17 - 20)  SpO2: 99% (08-22-24 @ 05:34) (97% - 99%)    GENERAL: NAD, lying in bed comfortably  HEAD:  Atraumatic, Normocephalic  EYES: EOMI, PERRLA, conjunctiva and sclera clear  ENT: Moist mucous membranes  NECK: Supple,   CHEST/LUNG: Clear to auscultation bilaterally; Unlabored respirations  HEART: Regular rate and rhythm;   ABDOMEN: BSx4; Soft, nontender, nondistended  EXTREMITIES: . No clubbing, cyanosis, or edema  NERVOUS SYSTEM:   no focal deficits confused AAOx2  SKIN: No rashes or lesions

## 2024-08-21 NOTE — ED PROVIDER NOTE - CLINICAL SUMMARY MEDICAL DECISION MAKING FREE TEXT BOX
83 y/o F with PMH dementia (lives alone) presenting to the ED w/ altered mental status.   Vitals stable.  Patient is A&OX2. Appears dry.  Will perform AMS work up with labs/CXR/CTH  Bolus fluids  Reassess    Labs WNL  CTH stable  Patient unsafe discharge as she lives alone  Plan for discharge home

## 2024-08-21 NOTE — ED ADULT NURSE NOTE - ED STAT RN HANDOFF DETAILS
Report endorsed to oncoming RN rob . Safety checks compld this shift/Safety rounds completed hourly.

## 2024-08-21 NOTE — H&P ADULT - HISTORY OF PRESENT ILLNESS
83 y/o F with PMH dementia (lives alone) presenting to the ED w/ altered mental status. Per EMS, patient's neighbor called because she is usually active and comes out of the house daily but today she was altered and covered in feces in the living room. Has not been seen since yesterday, daughter spoke to her a few days ago. Patient has no acute complaints but has some paranoid thoughts and thinks her daughter is stealing her money. Hx is limited due to dementia.

## 2024-08-21 NOTE — ED ADULT TRIAGE NOTE - CHIEF COMPLAINT QUOTE
BIBEMS from home c/o AMS failure to thrive 911 activated by neighbor Denia [phone] as per neighbor pt was last seen yesterday  and that she ussaly comes out daily , EMS found pt in living room  sitting up right AAO x person and time but confused  and covered in feces with her living environment full with clutter  , pt verbalized paranoia that her family is stealing her money. FS 88 pt denies any complaints

## 2024-08-21 NOTE — H&P ADULT - NSHPLABSRESULTS_GEN_ALL_CORE
=======================================================  Labs:                        12.4   9.67  )-----------( 228      ( 21 Aug 2024 16:20 )             37.3     08-21    139  |  104  |  23  ----------------------------<  81  3.5   |  22  |  1.02    Ca    9.3      21 Aug 2024 16:20    TPro  7.0  /  Alb  3.3  /  TBili  1.4<H>  /  DBili  x   /  AST  29  /  ALT  27  /  AlkPhos  55  08-21      Creatinine: 1.02 mg/dL (08-21-24 @ 16:20)            WBC Count: 9.67 K/uL (08-21-24 @ 16:20)    SARS-CoV-2 Result: NotDetec (08-21-24 @ 22:06)      Alkaline Phosphatase: 55 U/L (08-21-24 @ 16:20)  Alanine Aminotransferase (ALT/SGPT): 27 U/L (08-21-24 @ 16:20)  Aspartate Aminotransferase (AST/SGOT): 29 U/L (08-21-24 @ 16:20)  Bilirubin Total: 1.4 mg/dL (08-21-24 @ 16:20)

## 2024-08-22 DIAGNOSIS — F03.90 UNSPECIFIED DEMENTIA, UNSPECIFIED SEVERITY, WITHOUT BEHAVIORAL DISTURBANCE, PSYCHOTIC DISTURBANCE, MOOD DISTURBANCE, AND ANXIETY: ICD-10-CM

## 2024-08-22 LAB
CULTURE RESULTS: NO GROWTH — SIGNIFICANT CHANGE UP
SPECIMEN SOURCE: SIGNIFICANT CHANGE UP

## 2024-08-22 PROCEDURE — 99232 SBSQ HOSP IP/OBS MODERATE 35: CPT

## 2024-08-22 RX ADMIN — ENOXAPARIN SODIUM 40 MILLIGRAM(S): 100 INJECTION SUBCUTANEOUS at 06:36

## 2024-08-22 NOTE — PHYSICAL THERAPY INITIAL EVALUATION ADULT - ASSISTIVE DEVICE, REHAB EVAL
Detail Level: Detailed Quality 431: Preventive Care And Screening: Unhealthy Alcohol Use - Screening: Patient identified as an unhealthy alcohol user when screened for unhealthy alcohol use using a systematic screening method and received brief counseling Quality 226: Preventive Care And Screening: Tobacco Use: Screening And Cessation Intervention: Patient screened for tobacco use and is an ex/non-smoker bed rails

## 2024-08-22 NOTE — PHYSICAL THERAPY INITIAL EVALUATION ADULT - LEVEL OF INDEPENDENCE: SUPINE/SIT, REHAB EVAL
Subjective   Patient ID: Philip is a 28 year old male.    Chief Complaint   Patient presents with   • Establish Care     New patient    • Annual Exam     Non-fasting    • Referral     Sleep specialist      Snores at night.  Does it loud.  Wake up feeling tired sometimes.   Takes pauses when he breaths at night.        Philip has no past medical history on file.  Philip has Annual physical exam and Snoring on their problem list.  Philip has a past surgical history that includes No past surgeries.  His family history includes Patient is unaware of any medical problems in his mother and sister.  Philip reports that he has never smoked. He has never used smokeless tobacco. He reports current alcohol use. He reports that he does not use drugs.  Philip currently has no medications in their medication list.  Philip has No Known Allergies.    Review of Systems   Constitutional: Negative for fever.   HENT: Negative for ear pain.    Eyes: Negative for pain.   Respiratory: Negative for shortness of breath.    Cardiovascular: Negative for chest pain.   Gastrointestinal: Negative for abdominal pain.   Genitourinary: Negative for hematuria.   Allergic/Immunologic: Negative for environmental allergies.   Neurological: Negative for weakness and numbness.   Hematological: Does not bruise/bleed easily.   Psychiatric/Behavioral: Negative for agitation. The patient is not nervous/anxious.      Visit Vitals  /74   Pulse 90   Temp 98.1 °F (36.7 °C) (Tympanic)   Ht 5' 5\" (1.651 m)   Wt 72.8 kg (160 lb 9.6 oz)   SpO2 98%   BMI 26.73 kg/m²         Objective   Physical Exam  Vitals reviewed.   Constitutional:       General: He is not in acute distress.     Appearance: He is normal weight.   HENT:      Head: Normocephalic and atraumatic.      Right Ear: Tympanic membrane, ear canal and external ear normal. There is no impacted cerumen.      Left Ear: Tympanic membrane, ear canal and external ear normal. There is no impacted cerumen.      Neck: Normal  range of motion. No muscular tenderness.   Eyes:      General:         Right eye: No discharge.         Left eye: No discharge.      Pupils: Pupils are equal, round, and reactive to light.   Cardiovascular:      Rate and Rhythm: Normal rate and regular rhythm.      Heart sounds: Normal heart sounds. No murmur heard.  Pulmonary:      Effort: Pulmonary effort is normal. No respiratory distress.      Breath sounds: Normal breath sounds. No wheezing.   Abdominal:      General: Abdomen is flat. There is no distension.      Palpations: Abdomen is soft.      Tenderness: There is no abdominal tenderness. There is no guarding.   Lymphadenopathy:      Cervical: No cervical adenopathy.   Skin:     Findings: No rash.   Neurological:      Mental Status: He is alert.      Motor: No weakness.   Psychiatric:         Mood and Affect: Mood normal.         Behavior: Behavior normal.         Assessment   Problem List Items Addressed This Visit        Health Encounters    Annual physical exam - Primary     Check labs  Had the tdap and the covid shots              Sleep    Snoring     Sounds like apnea  Given dr hartman number                  Health Maintenance Summary     Varicella Vaccine (1 of 2 - 2-dose childhood series)  Overdue - never done    Influenza Vaccine (1)  Next due on 9/1/2022    Depression Screening (Yearly)  Next due on 7/27/2023    DTaP/Tdap/Td Vaccine (2 - Td or Tdap)  Next due on 4/9/2031    COVID-19 Vaccine   Completed    Hepatitis B Vaccine   Aged Out    Meningococcal Vaccine   Aged Out    HPV Vaccine   Aged Out    Pneumococcal Vaccine 0-64   Aged Out          Schedule follow up: in a year, at next annual exam       contact guard

## 2024-08-22 NOTE — PATIENT PROFILE ADULT - MST ORDER GENERATE MLM HIDDEN
3/15/2018    INSURER: Payor: MEDICARE / Plan: MEDICARE / Product Type: Medicare /     Re: Prior Authorization Request  Patient: Damien Vallecillo  Policy ID#:  [id number]  : 1938      To Whom it May Concern:    I am writing to formally request a prior authorization of coverage for my patient,  Damien Vallecillo, for treatment using thigh high zippered compression stockings.  I am requesting authorization for applicable provider professional and facility services associated with this therapy.    The therapy involves daily use.  Mr Vallecillo's recent knee arthroplasty has been complicated by his massive edema, and his elderly demented wife is responsible for applying current compression wraps. Vascular surgery has recommended lifelong compression to preserve the integrity of his  damaged skin. His edema is too massive for  over the counter compression stockings. His age and personal debility make it impossible for him to apply wraps himself.    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 Vallecillo is ambulatory.  However, he is in danger of losing his independence, his ability to ambulate, and perhaps his feet without appropriate treatment        I firmly believe that this therapy is clinically appropriate and that Damien Vallecillo would benefit from improved reduction in edema and maintenance of skin  MST Score 2 or > integrity if allowed the opportunity to receive this treatment.  Please contact me at Dept: [phone] if you require additional information to ensure the prompt approval for coverage.    Please send your written decision to me at this address:  [address] [id number] [phone]  Dept: [phone]        Sincerely,      Obi Strange MD        Enclosures

## 2024-08-22 NOTE — PATIENT PROFILE ADULT - FALL HARM RISK - HARM RISK INTERVENTIONS

## 2024-08-22 NOTE — PATIENT PROFILE ADULT - FALL HARM RISK - FALLEN IN PAST
ALYSSA from [facility] Resident as per EMS pt was running screaming in the lobby. Per pt "I was having as seizure and they did not care"
No

## 2024-08-22 NOTE — PHYSICAL THERAPY INITIAL EVALUATION ADULT - ADDITIONAL COMMENTS
Pt reports she lives alone in a house with 5 steps to enter without handrails. Pt resides on main level and pt states she was indep with ambulation/ADLS without AD. Denies recent falls.

## 2024-08-23 PROCEDURE — 99232 SBSQ HOSP IP/OBS MODERATE 35: CPT

## 2024-08-23 RX ADMIN — ENOXAPARIN SODIUM 40 MILLIGRAM(S): 100 INJECTION SUBCUTANEOUS at 06:25

## 2024-08-23 NOTE — DIETITIAN INITIAL EVALUATION ADULT - OTHER INFO
Pt seen on medical floor, adm w, pt unable to care for herself @ home. Pt w/ Dementia ( lives alone ) adm w/ AMS. Unable to obtain usual wt hx / diet hx 2/2 to confusion. No reports of N/V/D/C/Chewing/Swallowing issues, No food allergies. Pt w/ BMI = 17.2 on adm. Coccyx sacral spine stage 1.

## 2024-08-23 NOTE — DIETITIAN INITIAL EVALUATION ADULT - PERTINENT LABORATORY DATA
08-21    139  |  104  |  23  ----------------------------<  81  3.5   |  22  |  1.02    Ca    9.3      21 Aug 2024 16:20    TPro  7.0  /  Alb  3.3  /  TBili  1.4<H>  /  DBili  x   /  AST  29  /  ALT  27  /  AlkPhos  55  08-21

## 2024-08-23 NOTE — DIETITIAN INITIAL EVALUATION ADULT - PERTINENT MEDS FT
MEDICATIONS  (STANDING):  enoxaparin Injectable 40 milliGRAM(s) SubCutaneous every 24 hours    MEDICATIONS  (PRN):  acetaminophen     Tablet .. 650 milliGRAM(s) Oral every 6 hours PRN Temp greater or equal to 38C (100.4F), Mild Pain (1 - 3)  aluminum hydroxide/magnesium hydroxide/simethicone Suspension 30 milliLiter(s) Oral every 4 hours PRN Dyspepsia  melatonin 3 milliGRAM(s) Oral at bedtime PRN Insomnia  ondansetron Injectable 4 milliGRAM(s) IV Push every 8 hours PRN Nausea and/or Vomiting

## 2024-08-24 PROCEDURE — 99232 SBSQ HOSP IP/OBS MODERATE 35: CPT

## 2024-08-24 RX ADMIN — ENOXAPARIN SODIUM 40 MILLIGRAM(S): 100 INJECTION SUBCUTANEOUS at 06:04

## 2024-08-24 RX ADMIN — Medication 3 MILLIGRAM(S): at 22:21

## 2024-08-25 PROCEDURE — 99232 SBSQ HOSP IP/OBS MODERATE 35: CPT

## 2024-08-25 RX ADMIN — ENOXAPARIN SODIUM 40 MILLIGRAM(S): 100 INJECTION SUBCUTANEOUS at 06:00

## 2024-08-26 PROCEDURE — 99232 SBSQ HOSP IP/OBS MODERATE 35: CPT

## 2024-08-26 RX ADMIN — ENOXAPARIN SODIUM 40 MILLIGRAM(S): 100 INJECTION SUBCUTANEOUS at 06:07

## 2024-08-27 PROCEDURE — 99232 SBSQ HOSP IP/OBS MODERATE 35: CPT

## 2024-08-27 RX ADMIN — ENOXAPARIN SODIUM 40 MILLIGRAM(S): 100 INJECTION SUBCUTANEOUS at 06:26

## 2024-08-28 PROCEDURE — 99232 SBSQ HOSP IP/OBS MODERATE 35: CPT

## 2024-08-28 RX ADMIN — ENOXAPARIN SODIUM 40 MILLIGRAM(S): 100 INJECTION SUBCUTANEOUS at 05:58

## 2024-08-28 NOTE — PROGRESS NOTE ADULT - NUTRITIONAL ASSESSMENT
This patient has been assessed with a concern for Malnutrition and has been determined to have a diagnosis/diagnoses of Severe protein-calorie malnutrition and Underweight (BMI < 19).    This patient is being managed with:   Diet Regular-  Entered: Aug 21 2024  9:13PM  

## 2024-08-28 NOTE — PROGRESS NOTE ADULT - ASSESSMENT
83 y/o F with PMH dementia (lives alone) presenting to the ED w/ altered mental status. she was altered and covered in feces in the living room. Workup negative for infection or other concerns.   
85 y/o F with PMH dementia (lives alone) presenting to the ED w/ altered mental status. she was altered and covered in feces in the living room. Workup negative for infection or other concerns.     A/P:  #Dementia     Plan:   -pt lives alone and it appears it is not safe for her to live alone  -No evidence of infection or other causes of altered status. Likely dementia decline  -Social work for placement.      
 83 y/o F with PMH dementia (lives alone) presenting to the ED w/ altered mental status. she was altered and covered in feces in the living room. Workup negative for infection or other concerns.   
 85 y/o F with PMH dementia (lives alone) presenting to the ED w/ altered mental status. she was altered and covered in feces in the living room. Workup negative for infection or other concerns.   
 83 y/o F with PMH dementia (lives alone) presenting to the ED w/ altered mental status. she was altered and covered in feces in the living room. Workup negative for infection or other concerns.

## 2024-08-28 NOTE — PROGRESS NOTE ADULT - REASON FOR ADMISSION
Unable to care for herself at home

## 2024-08-28 NOTE — PROGRESS NOTE ADULT - SUBJECTIVE AND OBJECTIVE BOX
Patient is a 84y old  Female who presents with a chief complaint of Unable to care for herself at home (25 Aug 2024 12:53)    INTERVAL HPI/OVERNIGHT EVENTS:    MEDICATIONS  (STANDING):  enoxaparin Injectable 40 milliGRAM(s) SubCutaneous every 24 hours    MEDICATIONS  (PRN):  acetaminophen     Tablet .. 650 milliGRAM(s) Oral every 6 hours PRN Temp greater or equal to 38C (100.4F), Mild Pain (1 - 3)  aluminum hydroxide/magnesium hydroxide/simethicone Suspension 30 milliLiter(s) Oral every 4 hours PRN Dyspepsia  melatonin 3 milliGRAM(s) Oral at bedtime PRN Insomnia  ondansetron Injectable 4 milliGRAM(s) IV Push every 8 hours PRN Nausea and/or Vomiting    Allergies    No Known Allergies    Intolerances      REVIEW OF SYSTEMS:  All other systems reviewed and are negative    Vital Signs Last 24 Hrs  T(C): 36.7 (26 Aug 2024 05:44), Max: 36.7 (25 Aug 2024 17:22)  T(F): 98 (26 Aug 2024 05:44), Max: 98.1 (25 Aug 2024 23:10)  HR: 60 (26 Aug 2024 05:44) (60 - 70)  BP: 146/82 (26 Aug 2024 05:44) (145/78 - 150/78)  BP(mean): --  RR: 17 (26 Aug 2024 05:44) (17 - 18)  SpO2: 97% (26 Aug 2024 05:44) (97% - 98%)    Parameters below as of 26 Aug 2024 05:44  Patient On (Oxygen Delivery Method): room air      Daily     Daily Weight in k.5 (26 Aug 2024 05:44)  I&O's Summary    25 Aug 2024 07:01  -  26 Aug 2024 07:00  --------------------------------------------------------  IN: 400 mL / OUT: 0 mL / NET: 400 mL      CAPILLARY BLOOD GLUCOSE        PHYSICAL EXAM:  GENERAL: NAD,    HEAD:  Atraumatic, Normocephalic  EYES: EOMI, PERRLA, conjunctiva and sclera clear  ENMT: No tonsillar erythema, exudates, or enlargement; Moist mucous membranes, Good dentition, No lesions  NECK: Supple, No JVD, Normal thyroid  NERVOUS SYSTEM:  Alert & Oriented X3, Good concentration; Motor Strength 5/5 B/L upper and lower extremities; DTRs 2+ intact and symmetric  CHEST/LUNG: Clear to percussion bilaterally; No rales, rhonchi, wheezing, or rubs  HEART: Regular rate and rhythm; No murmurs, rubs, or gallops  ABDOMEN: Soft, Nontender, Nondistended; Bowel sounds present  EXTREMITIES:  2+ Peripheral Pulses, No clubbing, cyanosis, or edema  LYMPH: No lymphadenopathy noted  SKIN: No rashes or lesions    Labs                                DVT prophylaxis: > Lovenox 40mg SQ daily  > Heparin   > SCD's
Patient is a 84y old  Female who presents with a chief complaint of Unable to care for herself at home (26 Aug 2024 11:03)    INTERVAL HPI/OVERNIGHT EVENTS:    MEDICATIONS  (STANDING):  enoxaparin Injectable 40 milliGRAM(s) SubCutaneous every 24 hours    MEDICATIONS  (PRN):  acetaminophen     Tablet .. 650 milliGRAM(s) Oral every 6 hours PRN Temp greater or equal to 38C (100.4F), Mild Pain (1 - 3)  aluminum hydroxide/magnesium hydroxide/simethicone Suspension 30 milliLiter(s) Oral every 4 hours PRN Dyspepsia  melatonin 3 milliGRAM(s) Oral at bedtime PRN Insomnia  ondansetron Injectable 4 milliGRAM(s) IV Push every 8 hours PRN Nausea and/or Vomiting    Allergies    No Known Allergies    Intolerances      REVIEW OF SYSTEMS:  All other systems reviewed and are negative    Vital Signs Last 24 Hrs  T(C): 36.9 (27 Aug 2024 10:52), Max: 37.2 (26 Aug 2024 16:42)  T(F): 98.4 (27 Aug 2024 10:52), Max: 99 (26 Aug 2024 16:42)  HR: 61 (27 Aug 2024 10:52) (60 - 62)  BP: 154/61 (27 Aug 2024 10:52) (139/80 - 156/62)  BP(mean): --  RR: 16 (27 Aug 2024 10:52) (16 - 17)  SpO2: 99% (27 Aug 2024 10:52) (96% - 99%)    Parameters below as of 27 Aug 2024 10:52  Patient On (Oxygen Delivery Method): room air      Daily     Daily   I&O's Summary    26 Aug 2024 07:01  -  27 Aug 2024 07:00  --------------------------------------------------------  IN: 660 mL / OUT: 0 mL / NET: 660 mL      CAPILLARY BLOOD GLUCOSE        PHYSICAL EXAM:  GENERAL: NAD,    HEAD:  Atraumatic, Normocephalic  EYES: EOMI, PERRLA, conjunctiva and sclera clear  ENMT: No tonsillar erythema, exudates, or enlargement; Moist mucous membranes, Good dentition, No lesions  NECK: Supple, No JVD, Normal thyroid  NERVOUS SYSTEM:  Alert & Oriented X3, Good concentration; Motor Strength 5/5 B/L upper and lower extremities; DTRs 2+ intact and symmetric  CHEST/LUNG: Clear to percussion bilaterally; No rales, rhonchi, wheezing, or rubs  HEART: Regular rate and rhythm; No murmurs, rubs, or gallops  ABDOMEN: Soft, Nontender, Nondistended; Bowel sounds present  EXTREMITIES:  2+ Peripheral Pulses, No clubbing, cyanosis, or edema  LYMPH: No lymphadenopathy noted  SKIN: No rashes or lesions    Labs                                DVT prophylaxis: > Lovenox 40mg SQ daily  > Heparin   > SCD's
Patient is a 84y old  Female who presents with a chief complaint of Unable to care for herself at home (22 Aug 2024 10:29)    INTERVAL HPI/OVERNIGHT EVENTS:    MEDICATIONS  (STANDING):  enoxaparin Injectable 40 milliGRAM(s) SubCutaneous every 24 hours    MEDICATIONS  (PRN):  acetaminophen     Tablet .. 650 milliGRAM(s) Oral every 6 hours PRN Temp greater or equal to 38C (100.4F), Mild Pain (1 - 3)  aluminum hydroxide/magnesium hydroxide/simethicone Suspension 30 milliLiter(s) Oral every 4 hours PRN Dyspepsia  melatonin 3 milliGRAM(s) Oral at bedtime PRN Insomnia  ondansetron Injectable 4 milliGRAM(s) IV Push every 8 hours PRN Nausea and/or Vomiting    Allergies    No Known Allergies    Intolerances      REVIEW OF SYSTEMS:  All other systems reviewed and are negative    Vital Signs Last 24 Hrs  T(C): 36.7 (23 Aug 2024 05:12), Max: 36.7 (23 Aug 2024 05:12)  T(F): 98 (23 Aug 2024 05:12), Max: 98 (23 Aug 2024 05:12)  HR: 62 (23 Aug 2024 05:12) (60 - 62)  BP: 142/62 (23 Aug 2024 05:12) (125/60 - 142/62)  BP(mean): --  RR: 17 (23 Aug 2024 05:12) (16 - 19)  SpO2: 98% (23 Aug 2024 05:12) (98% - 98%)    Parameters below as of 23 Aug 2024 05:12  Patient On (Oxygen Delivery Method): room air      Daily     Daily Weight in k.9 (23 Aug 2024 05:12)  I&O's Summary    22 Aug 2024 07:01  -  23 Aug 2024 07:00  --------------------------------------------------------  IN: 0 mL / OUT: 700 mL / NET: -700 mL      CAPILLARY BLOOD GLUCOSE        PHYSICAL EXAM:  GENERAL: NAD,    HEAD:  Atraumatic, Normocephalic  EYES: EOMI, PERRLA, conjunctiva and sclera clear  ENMT: No tonsillar erythema, exudates, or enlargement; Moist mucous membranes, Good dentition, No lesions  NECK: Supple, No JVD, Normal thyroid  NERVOUS SYSTEM:  Alert & Oriented X3, Good concentration; Motor Strength 5/5 B/L upper and lower extremities; DTRs 2+ intact and symmetric  CHEST/LUNG: Clear to percussion bilaterally; No rales, rhonchi, wheezing, or rubs  HEART: Regular rate and rhythm; No murmurs, rubs, or gallops  ABDOMEN: Soft, Nontender, Nondistended; Bowel sounds present  EXTREMITIES:  2+ Peripheral Pulses, No clubbing, cyanosis, or edema  LYMPH: No lymphadenopathy noted  SKIN: No rashes or lesions    Labs                          12.4   9.67  )-----------( 228      ( 21 Aug 2024 16:20 )             37.3     08-21    139  |  104  |  23  ----------------------------<  81  3.5   |  22  |  1.02    Ca    9.3      21 Aug 2024 16:20    TPro  7.0  /  Alb  3.3  /  TBili  1.4<H>  /  DBili  x   /  AST  29  /  ALT  27  /  AlkPhos  55  08-21          Urinalysis Basic - ( 21 Aug 2024 19:40 )    Color: Yellow / Appearance: Cloudy / S.021 / pH: x  Gluc: x / Ketone: 40 mg/dL  / Bili: Negative / Urobili: 1.0 mg/dL   Blood: x / Protein: 100 mg/dL / Nitrite: Negative   Leuk Esterase: Negative / RBC: 0 /HPF / WBC 1 /HPF   Sq Epi: x / Non Sq Epi: x / Bacteria: Negative /HPF        Culture - Urine (collected 21 Aug 2024 19:40)  Source: Clean Catch Clean Catch (Midstream)  Final Report (22 Aug 2024 23:02):    No growth                DVT prophylaxis: > Lovenox 40mg SQ daily  > Heparin   > SCD's
Patient is a 84y old  Female who presents with a chief complaint of Unable to care for herself at home (24 Aug 2024 10:44)    INTERVAL HPI/OVERNIGHT EVENTS: no events     MEDICATIONS  (STANDING):  enoxaparin Injectable 40 milliGRAM(s) SubCutaneous every 24 hours    MEDICATIONS  (PRN):  acetaminophen     Tablet .. 650 milliGRAM(s) Oral every 6 hours PRN Temp greater or equal to 38C (100.4F), Mild Pain (1 - 3)  aluminum hydroxide/magnesium hydroxide/simethicone Suspension 30 milliLiter(s) Oral every 4 hours PRN Dyspepsia  melatonin 3 milliGRAM(s) Oral at bedtime PRN Insomnia  ondansetron Injectable 4 milliGRAM(s) IV Push every 8 hours PRN Nausea and/or Vomiting    Allergies    No Known Allergies    Intolerances      REVIEW OF SYSTEMS:  All other systems reviewed and are negative    Vital Signs Last 24 Hrs  T(C): 36.7 (25 Aug 2024 11:00), Max: 36.9 (24 Aug 2024 17:15)  T(F): 98.1 (25 Aug 2024 11:00), Max: 98.4 (24 Aug 2024 17:15)  HR: 59 (25 Aug 2024 11:00) (57 - 62)  BP: 142/63 (25 Aug 2024 11:00) (130/74 - 147/70)  BP(mean): --  RR: 17 (25 Aug 2024 11:00) (16 - 17)  SpO2: 98% (25 Aug 2024 11:00) (98% - 99%)    Parameters below as of 25 Aug 2024 11:00  Patient On (Oxygen Delivery Method): room air      Daily     Daily   I&O's Summary    25 Aug 2024 07:01  -  25 Aug 2024 12:53  --------------------------------------------------------  IN: 200 mL / OUT: 0 mL / NET: 200 mL      CAPILLARY BLOOD GLUCOSE        PHYSICAL EXAM:  GENERAL: NAD,    HEAD:  Atraumatic, Normocephalic  EYES: EOMI, PERRLA, conjunctiva and sclera clear  ENMT: No tonsillar erythema, exudates, or enlargement; Moist mucous membranes, Good dentition, No lesions  NECK: Supple, No JVD, Normal thyroid  NERVOUS SYSTEM:  Alert & Oriented X3, Good concentration; Motor Strength 5/5 B/L upper and lower extremities; DTRs 2+ intact and symmetric  CHEST/LUNG: Clear to percussion bilaterally; No rales, rhonchi, wheezing, or rubs  HEART: Regular rate and rhythm; No murmurs, rubs, or gallops  ABDOMEN: Soft, Nontender, Nondistended; Bowel sounds present  EXTREMITIES:  2+ Peripheral Pulses, No clubbing, cyanosis, or edema  LYMPH: No lymphadenopathy noted  SKIN: No rashes or lesions    Labs                                DVT prophylaxis: > Lovenox 40mg SQ daily  > Heparin   > SCD's
Patient is a 84y old  Female who presents with a chief complaint of Unable to care for herself at home (21 Aug 2024 22:25)    INTERVAL HPI/OVERNIGHT EVENTS:    MEDICATIONS  (STANDING):  enoxaparin Injectable 40 milliGRAM(s) SubCutaneous every 24 hours    MEDICATIONS  (PRN):  acetaminophen     Tablet .. 650 milliGRAM(s) Oral every 6 hours PRN Temp greater or equal to 38C (100.4F), Mild Pain (1 - 3)  aluminum hydroxide/magnesium hydroxide/simethicone Suspension 30 milliLiter(s) Oral every 4 hours PRN Dyspepsia  melatonin 3 milliGRAM(s) Oral at bedtime PRN Insomnia  ondansetron Injectable 4 milliGRAM(s) IV Push every 8 hours PRN Nausea and/or Vomiting    Allergies    No Known Allergies    Intolerances      REVIEW OF SYSTEMS:  All other systems reviewed and are negative    Vital Signs Last 24 Hrs  T(C): 36.6 (22 Aug 2024 05:34), Max: 36.7 (21 Aug 2024 23:37)  T(F): 97.9 (22 Aug 2024 05:34), Max: 98.1 (21 Aug 2024 23:37)  HR: 54 (22 Aug 2024 05:34) (54 - 72)  BP: 151/70 (22 Aug 2024 05:34) (132/80 - 151/70)  BP(mean): 97 (21 Aug 2024 22:19) (97 - 97)  RR: 18 (22 Aug 2024 05:34) (17 - 20)  SpO2: 99% (22 Aug 2024 05:34) (97% - 99%)    Parameters below as of 22 Aug 2024 05:34  Patient On (Oxygen Delivery Method): room air      Daily Height in cm: 157.48 (21 Aug 2024 15:37)    Daily Weight in k.8 (22 Aug 2024 00:04)  I&O's Summary    CAPILLARY BLOOD GLUCOSE      POCT Blood Glucose.: 88 mg/dL (21 Aug 2024 15:40)    PHYSICAL EXAM:  GENERAL: NAD,    HEAD:  Atraumatic, Normocephalic  EYES: EOMI, PERRLA, conjunctiva and sclera clear  ENMT: No tonsillar erythema, exudates, or enlargement; Moist mucous membranes, Good dentition, No lesions  NECK: Supple, No JVD, Normal thyroid  NERVOUS SYSTEM:  Alert & Oriented X3, Good concentration; Motor Strength 5/5 B/L upper and lower extremities; DTRs 2+ intact and symmetric  CHEST/LUNG: Clear to percussion bilaterally; No rales, rhonchi, wheezing, or rubs  HEART: Regular rate and rhythm; No murmurs, rubs, or gallops  ABDOMEN: Soft, Nontender, Nondistended; Bowel sounds present  EXTREMITIES:  2+ Peripheral Pulses, No clubbing, cyanosis, or edema  LYMPH: No lymphadenopathy noted  SKIN: No rashes or lesions    Labs                          12.4   9.67  )-----------( 228      ( 21 Aug 2024 16:20 )             37.3     08-21    139  |  104  |  23  ----------------------------<  81  3.5   |  22  |  1.02    Ca    9.3      21 Aug 2024 16:20    TPro  7.0  /  Alb  3.3  /  TBili  1.4<H>  /  DBili  x   /  AST  29  /  ALT  27  /  AlkPhos  55  08-          Urinalysis Basic - ( 21 Aug 2024 19:40 )    Color: Yellow / Appearance: Cloudy / S.021 / pH: x  Gluc: x / Ketone: 40 mg/dL  / Bili: Negative / Urobili: 1.0 mg/dL   Blood: x / Protein: 100 mg/dL / Nitrite: Negative   Leuk Esterase: Negative / RBC: 0 /HPF / WBC 1 /HPF   Sq Epi: x / Non Sq Epi: x / Bacteria: Negative /HPF                  DVT prophylaxis: > Lovenox 40mg SQ daily  > Heparin   > SCD's
    Patient is a 84y old  Female who presents with a chief complaint of Unable to care for herself at home (27 Aug 2024 12:07)      INTERVAL HPI/OVERNIGHT EVENTS: no events noted overnight.    MEDICATIONS  (STANDING):  enoxaparin Injectable 40 milliGRAM(s) SubCutaneous every 24 hours    MEDICATIONS  (PRN):  acetaminophen     Tablet .. 650 milliGRAM(s) Oral every 6 hours PRN Temp greater or equal to 38C (100.4F), Mild Pain (1 - 3)  aluminum hydroxide/magnesium hydroxide/simethicone Suspension 30 milliLiter(s) Oral every 4 hours PRN Dyspepsia  melatonin 3 milliGRAM(s) Oral at bedtime PRN Insomnia  ondansetron Injectable 4 milliGRAM(s) IV Push every 8 hours PRN Nausea and/or Vomiting      __________________________________________________  REVIEW OF SYSTEMS:    CONSTITUTIONAL: No fever,   EYES: no acute visual disturbances  NECK: No pain or stiffness  RESPIRATORY: No cough; No shortness of breath  CARDIOVASCULAR: No chest pain, no palpitations  GASTROINTESTINAL: No pain. No nausea or vomiting; No diarrhea   NEUROLOGICAL: No headache or numbness, no tremors  MUSCULOSKELETAL: No joint pain, no muscle pain  GENITOURINARY: no dysuria, no frequency, no hesitancy  PSYCHIATRY: no depression , no anxiety  ALL OTHER  ROS negative        Vital Signs Last 24 Hrs  T(C): 36.8 (28 Aug 2024 10:40), Max: 36.8 (27 Aug 2024 23:17)  T(F): 98.2 (28 Aug 2024 10:40), Max: 98.3 (27 Aug 2024 23:17)  HR: 68 (28 Aug 2024 10:40) (63 - 80)  BP: 134/61 (28 Aug 2024 10:40) (134/61 - 156/70)  BP(mean): --  RR: 18 (28 Aug 2024 10:40) (18 - 20)  SpO2: 97% (28 Aug 2024 10:40) (97% - 99%)    Parameters below as of 28 Aug 2024 10:40  Patient On (Oxygen Delivery Method): room air        ________________________________________________  PHYSICAL EXAM:  GENERAL: NAD  HEENT: Normocephalic;  conjunctivae and sclerae clear; moist mucous membranes;   NECK : supple  CHEST/LUNG: Clear to auscultation bilaterally with good air entry   HEART: S1 S2  regular; no murmurs, gallops or rubs  ABDOMEN: Soft, Nontender, Nondistended; Bowel sounds present  EXTREMITIES: no cyanosis; no edema; no calf tenderness  SKIN: warm and dry; no rash  NERVOUS SYSTEM:  Awake and alert; Oriented x 2    _________________________________________________  LABS:              CAPILLARY BLOOD GLUCOSE            RADIOLOGY & ADDITIONAL TESTS:    Imaging Personally Reviewed:  YES    Consultant(s) Notes Reviewed:   YES    Care Discussed with Consultants : YES     Plan of care was discussed with patient and /or primary care giver; all questions and concerns were addressed and care was aligned with patient's wishes.    
Patient is a 84y old  Female who presents with a chief complaint of AMS     (23 Aug 2024 13:41)    INTERVAL HPI/OVERNIGHT EVENTS:    MEDICATIONS  (STANDING):  enoxaparin Injectable 40 milliGRAM(s) SubCutaneous every 24 hours    MEDICATIONS  (PRN):  acetaminophen     Tablet .. 650 milliGRAM(s) Oral every 6 hours PRN Temp greater or equal to 38C (100.4F), Mild Pain (1 - 3)  aluminum hydroxide/magnesium hydroxide/simethicone Suspension 30 milliLiter(s) Oral every 4 hours PRN Dyspepsia  melatonin 3 milliGRAM(s) Oral at bedtime PRN Insomnia  ondansetron Injectable 4 milliGRAM(s) IV Push every 8 hours PRN Nausea and/or Vomiting    Allergies    No Known Allergies    Intolerances      REVIEW OF SYSTEMS:  All other systems reviewed and are negative    Vital Signs Last 24 Hrs  T(C): 36.5 (24 Aug 2024 05:50), Max: 36.6 (23 Aug 2024 17:20)  T(F): 97.7 (24 Aug 2024 05:50), Max: 97.8 (23 Aug 2024 17:20)  HR: 63 (24 Aug 2024 05:50) (63 - 73)  BP: 136/76 (24 Aug 2024 05:50) (131/70 - 136/76)  BP(mean): --  RR: 16 (24 Aug 2024 05:50) (16 - 18)  SpO2: 98% (24 Aug 2024 05:50) (98% - 99%)    Parameters below as of 24 Aug 2024 05:50  Patient On (Oxygen Delivery Method): room air      Daily     Daily Weight in k (24 Aug 2024 05:50)  I&O's Summary    CAPILLARY BLOOD GLUCOSE        PHYSICAL EXAM:  GENERAL: NAD,    HEAD:  Atraumatic, Normocephalic  EYES: EOMI, PERRLA, conjunctiva and sclera clear  ENMT: No tonsillar erythema, exudates, or enlargement; Moist mucous membranes, Good dentition, No lesions  NECK: Supple, No JVD, Normal thyroid  CHEST/LUNG: Clear to percussion bilaterally; No rales, rhonchi, wheezing, or rubs  HEART: Regular rate and rhythm; No murmurs, rubs, or gallops  ABDOMEN: Soft, Nontender, Nondistended; Bowel sounds present  EXTREMITIES:  2+ Peripheral Pulses, No clubbing, cyanosis, or edema  LYMPH: No lymphadenopathy noted  SKIN: No rashes or lesions    Labs                      Culture - Urine (collected 21 Aug 2024 19:40)  Source: Clean Catch Clean Catch (Midstream)  Final Report (22 Aug 2024 23:02):    No growth                DVT prophylaxis: > Lovenox 40mg SQ daily  > Heparin   > SCD's

## 2024-08-29 ENCOUNTER — TRANSCRIPTION ENCOUNTER (OUTPATIENT)
Age: 84
End: 2024-08-29

## 2024-08-29 VITALS
SYSTOLIC BLOOD PRESSURE: 158 MMHG | RESPIRATION RATE: 18 BRPM | HEART RATE: 58 BPM | OXYGEN SATURATION: 98 % | TEMPERATURE: 98 F | DIASTOLIC BLOOD PRESSURE: 66 MMHG

## 2024-08-29 PROCEDURE — 99239 HOSP IP/OBS DSCHRG MGMT >30: CPT

## 2024-08-29 RX ORDER — LOSARTAN POTASSIUM 50 MG/1
1 TABLET ORAL
Qty: 0 | Refills: 2 | DISCHARGE

## 2024-08-29 RX ADMIN — ENOXAPARIN SODIUM 40 MILLIGRAM(S): 100 INJECTION SUBCUTANEOUS at 06:33

## 2024-08-29 NOTE — DISCHARGE NOTE PROVIDER - HOSPITAL COURSE
85 y/o F with PMH Dementia (lives alone) presenting to the ED w/ altered mental status. She was altered and covered in feces in the living room. Workup negative for infection or other concerns. CT Head negative for acute intracranial abnormalitis. Mental status change likely due to Dementia decline. Not safe for patient to return home, admitted for placement. Evaluated by PT and recommended ROMY. Patient accepted to [facility]. 83 y/o F with PMH Dementia (lives alone) presenting to the ED w/ altered mental status. She was altered and covered in feces in the living room. Workup negative for infection or other concerns. CT Head negative for acute intracranial abnormalities. Mental status change likely due to Dementia decline. Not safe for patient to return home, admitted for placement. Evaluated by PT and recommended ROMY. Patient accepted to [facility].

## 2024-08-29 NOTE — DISCHARGE NOTE NURSING/CASE MANAGEMENT/SOCIAL WORK - PATIENT PORTAL LINK FT
You can access the FollowMyHealth Patient Portal offered by [facility] by registering at the following website: [email or web address]. By joining [facility]’s FollowMyHealth portal, you will also be able to view your health information using other applications (apps) compatible with our system.

## 2024-08-29 NOTE — DISCHARGE NOTE PROVIDER - DETAILS OF MALNUTRITION DIAGNOSIS/DIAGNOSES
This patient has been assessed with a concern for Malnutrition and was treated during this hospitalization for the following Nutrition diagnosis/diagnoses:     -  08/23/2024: Severe protein-calorie malnutrition   -  08/23/2024: Underweight (BMI < 19)

## 2024-08-29 NOTE — DISCHARGE NOTE PROVIDER - ATTENDING DISCHARGE PHYSICAL EXAMINATION:
Vital Signs Last 24 Hrs  T(C): 36.8 (29 Aug 2024 11:05), Max: 36.8 (28 Aug 2024 17:48)  T(F): 98.2 (29 Aug 2024 11:05), Max: 98.3 (29 Aug 2024 05:05)  HR: 60 (29 Aug 2024 11:05) (60 - 71)  BP: 136/53 (29 Aug 2024 11:05) (136/53 - 165/70)  BP(mean): --  RR: 17 (29 Aug 2024 11:05) (17 - 19)  SpO2: 98% (29 Aug 2024 11:05) (97% - 98%)    Parameters below as of 29 Aug 2024 11:05  Patient On (Oxygen Delivery Method): room air    CONSTITUTIONAL: Well appearing, elderly lady   ENMT: Airway patent, Nasal mucosa clear. Mouth with normal mucosa.   EYES: Clear bilaterally, pupils equal, round and reactive to light.  CARDIAC: Normal rate, regular rhythm.  Heart sounds S1, S2.  No murmurs, rubs or gallops   RESPIRATORY: Breath sounds clear and equal bilaterally. No wheezes, rales or rhonchi  GI; abd soft, non tender   EXTREMITIES: No edema, cyanosis or deformity   NEUROLOGICAL: Alert and oriented x 1   SKIN: No rash, skin turgor

## 2024-08-29 NOTE — DISCHARGE NOTE PROVIDER - NSDCMRMEDTOKEN_GEN_ALL_CORE_FT
LOSARTAN 50MG TABLETS: 1 tab(s) orally once a day 1 tablet daily  melatonin 3 mg oral tablet: 1 tab(s) orally once a day (at bedtime) As needed Insomnia   melatonin 3 mg oral tablet: 1 tab(s) orally once a day (at bedtime) As needed Insomnia

## 2024-08-29 NOTE — DISCHARGE NOTE PROVIDER - NSDCCPCAREPLAN_GEN_ALL_CORE_FT
PRINCIPAL DISCHARGE DIAGNOSIS  Diagnosis: Altered mental status  Assessment and Plan of Treatment:      PRINCIPAL DISCHARGE DIAGNOSIS  Diagnosis: Dementia  Assessment and Plan of Treatment: Patient was brought in for altered mental status, her initial basic w/u was negative for any infection. Patient likely has dementia and is being discharged to a long term facility.

## 2024-08-29 NOTE — DISCHARGE NOTE PROVIDER - PROVIDER TOKENS
FREE:[LAST:[Your PCP/MD at [facility]],PHONE:[(   )    -],FAX:[(   )    -],FOLLOWUP:[2 weeks]]

## 2024-08-29 NOTE — DISCHARGE NOTE NURSING/CASE MANAGEMENT/SOCIAL WORK - NSDCPEFALRISK_GEN_ALL_CORE
For information on Fall & Injury Prevention, visit: [email or web address] OR  [email or web address] OR  https://www.cdc.gov/steadi/patient.html

## 2024-09-05 DIAGNOSIS — F03.90 UNSPECIFIED DEMENTIA, UNSPECIFIED SEVERITY, WITHOUT BEHAVIORAL DISTURBANCE, PSYCHOTIC DISTURBANCE, MOOD DISTURBANCE, AND ANXIETY: ICD-10-CM

## 2024-09-05 DIAGNOSIS — R41.82 ALTERED MENTAL STATUS, UNSPECIFIED: ICD-10-CM

## 2024-09-05 DIAGNOSIS — E43 UNSPECIFIED SEVERE PROTEIN-CALORIE MALNUTRITION: ICD-10-CM
